# Patient Record
Sex: FEMALE | Race: WHITE | NOT HISPANIC OR LATINO
[De-identification: names, ages, dates, MRNs, and addresses within clinical notes are randomized per-mention and may not be internally consistent; named-entity substitution may affect disease eponyms.]

---

## 2019-02-11 ENCOUNTER — RESULT REVIEW (OUTPATIENT)
Age: 54
End: 2019-02-11

## 2019-03-21 ENCOUNTER — OUTPATIENT (OUTPATIENT)
Dept: OUTPATIENT SERVICES | Facility: HOSPITAL | Age: 54
LOS: 1 days | End: 2019-03-21
Payer: COMMERCIAL

## 2019-03-21 VITALS
WEIGHT: 207.01 LBS | RESPIRATION RATE: 18 BRPM | HEART RATE: 70 BPM | TEMPERATURE: 98 F | DIASTOLIC BLOOD PRESSURE: 60 MMHG | SYSTOLIC BLOOD PRESSURE: 116 MMHG | HEIGHT: 67 IN

## 2019-03-21 DIAGNOSIS — Z15.01 GENETIC SUSCEPTIBILITY TO MALIGNANT NEOPLASM OF BREAST: ICD-10-CM

## 2019-03-21 DIAGNOSIS — R73.03 PREDIABETES: ICD-10-CM

## 2019-03-21 DIAGNOSIS — Z90.710 ACQUIRED ABSENCE OF BOTH CERVIX AND UTERUS: Chronic | ICD-10-CM

## 2019-03-21 LAB
ANION GAP SERPL CALC-SCNC: 11 MMO/L — SIGNIFICANT CHANGE UP (ref 7–14)
BLD GP AB SCN SERPL QL: NEGATIVE — SIGNIFICANT CHANGE UP
BUN SERPL-MCNC: 16 MG/DL — SIGNIFICANT CHANGE UP (ref 7–23)
CALCIUM SERPL-MCNC: 9.8 MG/DL — SIGNIFICANT CHANGE UP (ref 8.4–10.5)
CHLORIDE SERPL-SCNC: 104 MMOL/L — SIGNIFICANT CHANGE UP (ref 98–107)
CO2 SERPL-SCNC: 25 MMOL/L — SIGNIFICANT CHANGE UP (ref 22–31)
CREAT SERPL-MCNC: 0.77 MG/DL — SIGNIFICANT CHANGE UP (ref 0.5–1.3)
GLUCOSE SERPL-MCNC: 163 MG/DL — HIGH (ref 70–99)
HBA1C BLD-MCNC: 6.3 % — HIGH (ref 4–5.6)
HCT VFR BLD CALC: 39.3 % — SIGNIFICANT CHANGE UP (ref 34.5–45)
HGB BLD-MCNC: 12.7 G/DL — SIGNIFICANT CHANGE UP (ref 11.5–15.5)
MCHC RBC-ENTMCNC: 29.5 PG — SIGNIFICANT CHANGE UP (ref 27–34)
MCHC RBC-ENTMCNC: 32.3 % — SIGNIFICANT CHANGE UP (ref 32–36)
MCV RBC AUTO: 91.4 FL — SIGNIFICANT CHANGE UP (ref 80–100)
NRBC # FLD: 0 K/UL — LOW (ref 25–125)
PLATELET # BLD AUTO: 287 K/UL — SIGNIFICANT CHANGE UP (ref 150–400)
PMV BLD: 9.8 FL — SIGNIFICANT CHANGE UP (ref 7–13)
POTASSIUM SERPL-MCNC: 3.8 MMOL/L — SIGNIFICANT CHANGE UP (ref 3.5–5.3)
POTASSIUM SERPL-SCNC: 3.8 MMOL/L — SIGNIFICANT CHANGE UP (ref 3.5–5.3)
RBC # BLD: 4.3 M/UL — SIGNIFICANT CHANGE UP (ref 3.8–5.2)
RBC # FLD: 13.3 % — SIGNIFICANT CHANGE UP (ref 10.3–14.5)
RH IG SCN BLD-IMP: POSITIVE — SIGNIFICANT CHANGE UP
SODIUM SERPL-SCNC: 140 MMOL/L — SIGNIFICANT CHANGE UP (ref 135–145)
WBC # BLD: 7.12 K/UL — SIGNIFICANT CHANGE UP (ref 3.8–10.5)
WBC # FLD AUTO: 7.12 K/UL — SIGNIFICANT CHANGE UP (ref 3.8–10.5)

## 2019-03-21 PROCEDURE — 93010 ELECTROCARDIOGRAM REPORT: CPT

## 2019-03-21 RX ORDER — SODIUM CHLORIDE 9 MG/ML
1000 INJECTION, SOLUTION INTRAVENOUS
Qty: 0 | Refills: 0 | Status: DISCONTINUED | OUTPATIENT
Start: 2019-04-03 | End: 2019-04-03

## 2019-03-21 NOTE — H&P PST ADULT - NSICDXFAMILYHX_GEN_ALL_CORE_FT
FAMILY HISTORY:  Father  Still living? No  Family history of early CAD, Age at diagnosis: Age Unknown    Mother  Still living? Yes, Estimated age: 81-90  Family history of hypertension, Age at diagnosis: Age Unknown

## 2019-03-21 NOTE — H&P PST ADULT - NEGATIVE MUSCULOSKELETAL SYMPTOMS
no back pain/no muscle cramps/no myalgia/no arthritis/no muscle weakness/no joint swelling/no arthralgia/no stiffness/no neck pain

## 2019-03-21 NOTE — H&P PST ADULT - NSICDXPASTMEDICALHX_GEN_ALL_CORE_FT
PAST MEDICAL HISTORY:  Allergic rhinitis     Asthma     Genetic susceptibility to breast cancer     Herpes simplex virus (HSV) infection of central nervous system     Obese     Prediabetes

## 2019-03-21 NOTE — H&P PST ADULT - HISTORY OF PRESENT ILLNESS
52 yo female presents to PST unit with pre-op diagnosis of 52 yo female presents to PST unit with pre-op diagnosis of genetic susceptibility to malignant neoplasm of breast scheduled for laparoscopic bilateral salpingo oophorectomy on 04/03/2019.

## 2019-03-21 NOTE — H&P PST ADULT - NSANTHOSAYNRD_GEN_A_CORE
No. MILLER screening performed.  STOP BANG Legend: 0-2 = LOW Risk; 3-4 = INTERMEDIATE Risk; 5-8 = HIGH Risk

## 2019-03-21 NOTE — H&P PST ADULT - NSICDXPROBLEM_GEN_ALL_CORE_FT
PROBLEM DIAGNOSES  Problem: Genetic susceptibility to breast cancer  Assessment and Plan: Scheduled for laparoscopic bilateral salpingo oophorectomy on 04/03/2019.  Pre-Op instructions provided to patient.  Pepcid for GI prophylaxis provided.   Surgical scrub instructions reviewed and provided to patient.   Patient will obtain medical clearance as per surgeons request-copy requested    Problem: Prediabetes  Assessment and Plan: Pt. instructed to hold Metformin the night before and morning of procedure. Accucheck DOS. OR booking notified of DM2

## 2019-03-21 NOTE — H&P PST ADULT - NEGATIVE NEUROLOGICAL SYMPTOMS
no paresthesias/no weakness/no generalized seizures/occasional headache relieved by OTC Tylenol/no focal seizures/no transient paralysis

## 2019-03-21 NOTE — H&P PST ADULT - NSICDXPASTSURGICALHX_GEN_ALL_CORE_FT
PAST SURGICAL HISTORY:  Carpal tunnel syndrome of right wrist Repair done on 5/7/14.    H/O: hysterectomy partial 2015    History of tonsillectomy at the age of 6

## 2019-03-21 NOTE — H&P PST ADULT - NEGATIVE ENMT SYMPTOMS
no dysphagia/no sinus symptoms/no vertigo/no tinnitus/no hearing difficulty/no ear pain/no nasal congestion

## 2019-03-21 NOTE — H&P PST ADULT - RS GEN PE MLT RESP DETAILS PC
no wheezes/good air movement/breath sounds equal/respirations non-labored/clear to auscultation bilaterally/airway patent

## 2019-03-22 ENCOUNTER — TRANSCRIPTION ENCOUNTER (OUTPATIENT)
Age: 54
End: 2019-03-22

## 2019-04-02 ENCOUNTER — TRANSCRIPTION ENCOUNTER (OUTPATIENT)
Age: 54
End: 2019-04-02

## 2019-04-02 NOTE — ASU PATIENT PROFILE, ADULT - PMH
Allergic rhinitis    Asthma    Genetic susceptibility to breast cancer  BRCA 1 positive  Herpes simplex virus (HSV) infection of central nervous system    Obese    Prediabetes

## 2019-04-02 NOTE — ASU PATIENT PROFILE, ADULT - PSH
Carpal tunnel syndrome of left wrist  surgery 2014  Carpal tunnel syndrome of right wrist  Repair done on 5/7/14.  H/O: hysterectomy  partial 2015  History of tonsillectomy  at the age of 6  Status post dilation and curettage

## 2019-04-03 ENCOUNTER — RESULT REVIEW (OUTPATIENT)
Age: 54
End: 2019-04-03

## 2019-04-03 ENCOUNTER — OUTPATIENT (OUTPATIENT)
Dept: OUTPATIENT SERVICES | Facility: HOSPITAL | Age: 54
LOS: 1 days | Discharge: HOME | End: 2019-04-03
Payer: COMMERCIAL

## 2019-04-03 VITALS — OXYGEN SATURATION: 98 % | HEART RATE: 55 BPM | RESPIRATION RATE: 16 BRPM | TEMPERATURE: 98 F

## 2019-04-03 VITALS
WEIGHT: 207.01 LBS | HEART RATE: 71 BPM | HEIGHT: 67 IN | TEMPERATURE: 98 F | DIASTOLIC BLOOD PRESSURE: 51 MMHG | RESPIRATION RATE: 16 BRPM | SYSTOLIC BLOOD PRESSURE: 113 MMHG | OXYGEN SATURATION: 95 %

## 2019-04-03 DIAGNOSIS — G56.02 CARPAL TUNNEL SYNDROME, LEFT UPPER LIMB: Chronic | ICD-10-CM

## 2019-04-03 DIAGNOSIS — Z98.890 OTHER SPECIFIED POSTPROCEDURAL STATES: Chronic | ICD-10-CM

## 2019-04-03 DIAGNOSIS — Z90.710 ACQUIRED ABSENCE OF BOTH CERVIX AND UTERUS: Chronic | ICD-10-CM

## 2019-04-03 DIAGNOSIS — Z15.01 GENETIC SUSCEPTIBILITY TO MALIGNANT NEOPLASM OF BREAST: ICD-10-CM

## 2019-04-03 DIAGNOSIS — C50.919 MALIGNANT NEOPLASM OF UNSPECIFIED SITE OF UNSPECIFIED FEMALE BREAST: ICD-10-CM

## 2019-04-03 LAB — GLUCOSE BLDC GLUCOMTR-MCNC: 146 MG/DL — HIGH (ref 70–99)

## 2019-04-03 PROCEDURE — 88305 TISSUE EXAM BY PATHOLOGIST: CPT | Mod: 26

## 2019-04-03 NOTE — ASU DISCHARGE PLAN (ADULT/PEDIATRIC) - NURSING INSTRUCTIONS
You received IV Tylenol for pain management at _8:00am__. Please DO NOT take any Tylenol (Acetaminophen) containing products, such as Vicodin, Percocet, Excedrin, and cold medications for the next 6 hours (until _2__ PM). DO NOT TAKE MORE THAN 3000 MG OF TYLENOL in a 24 hour period.  You received IV Toradol for pain management at _8:30am__. Please DO NOT take Motrin/Ibuprofen/Advil/Aleve/NSAIDs (Non-Steroidal Anti-Inflammatory Drugs) for the next 6 hours (until _2:30__ PM).

## 2019-04-03 NOTE — ASU DISCHARGE PLAN (ADULT/PEDIATRIC) - CARE PROVIDER_API CALL
Oziel Chavez)  Obstetrics and Gynecology  [address] [place] [address]  Phone: [phone]  Fax: [phone]  Follow Up Time:

## 2019-04-11 LAB — SURGICAL PATHOLOGY STUDY: SIGNIFICANT CHANGE UP

## 2019-10-30 ENCOUNTER — RESULT REVIEW (OUTPATIENT)
Age: 54
End: 2019-10-30

## 2020-10-14 ENCOUNTER — RESULT REVIEW (OUTPATIENT)
Age: 55
End: 2020-10-14

## 2021-10-18 ENCOUNTER — RESULT REVIEW (OUTPATIENT)
Age: 56
End: 2021-10-18

## 2022-06-19 ENCOUNTER — OFFICE (OUTPATIENT)
Dept: URBAN - METROPOLITAN AREA CLINIC 77 | Facility: CLINIC | Age: 57
Setting detail: OPHTHALMOLOGY
End: 2022-06-19
Payer: COMMERCIAL

## 2022-06-19 DIAGNOSIS — E11.9: ICD-10-CM

## 2022-06-19 DIAGNOSIS — H47.321: ICD-10-CM

## 2022-06-19 DIAGNOSIS — H25.13: ICD-10-CM

## 2022-06-19 DIAGNOSIS — H43.811: ICD-10-CM

## 2022-06-19 DIAGNOSIS — H46.8: ICD-10-CM

## 2022-06-19 PROCEDURE — 92014 COMPRE OPH EXAM EST PT 1/>: CPT | Performed by: STUDENT IN AN ORGANIZED HEALTH CARE EDUCATION/TRAINING PROGRAM

## 2022-06-19 PROCEDURE — 92083 EXTENDED VISUAL FIELD XM: CPT | Performed by: STUDENT IN AN ORGANIZED HEALTH CARE EDUCATION/TRAINING PROGRAM

## 2022-06-19 PROCEDURE — 92133 CPTRZD OPH DX IMG PST SGM ON: CPT | Performed by: STUDENT IN AN ORGANIZED HEALTH CARE EDUCATION/TRAINING PROGRAM

## 2022-06-19 ASSESSMENT — REFRACTION_CURRENTRX
OD_OVR_VA: 20/
OD_AXIS: 55
OS_OVR_VA: 20/
OD_ADD2: COMPUTER
OD_SPHERE: +1.75
OS_AXIS: 115
OD_CYLINDER: -0.50
OS_SPHERE: +1.00
OS_CYLINDER: -1.00

## 2022-06-19 ASSESSMENT — REFRACTION_MANIFEST
OD_AXIS: 55
OD_CYLINDER: -0.50
OS_ADD2: +2.25
OD_ADD: +1.25
OS_ADD: +1.25
OS_SPHERE: -0.75
OS_CYLINDER: -0.50
OS_VA1: 20/25
OS_AXIS: 95
OD_ADD2: +2.25
OD_SPHERE: +0.50
OD_VA1: 20/20

## 2022-06-19 ASSESSMENT — VISUAL ACUITY
OS_BCVA: 20/20
OD_BCVA: 20/40

## 2022-06-19 ASSESSMENT — KERATOMETRY
OD_AXISANGLE_DEGREES: 087
OD_K1POWER_DIOPTERS: 45.00
OS_K2POWER_DIOPTERS: 46.00
OS_AXISANGLE_DEGREES: 082
OS_K1POWER_DIOPTERS: 45.00
OD_K2POWER_DIOPTERS: 46.00

## 2022-06-19 ASSESSMENT — AXIALLENGTH_DERIVED
OD_AL: 22.7882
OD_AL: 22.8338
OS_AL: 23.2519
OS_AL: 23.2519

## 2022-06-19 ASSESSMENT — TONOMETRY
OD_IOP_MMHG: 14
OS_IOP_MMHG: 12

## 2022-06-19 ASSESSMENT — REFRACTION_AUTOREFRACTION
OD_AXIS: 52
OD_CYLINDER: -0.75
OD_SPHERE: +0.50
OS_CYLINDER: -0.50
OS_SPHERE: -0.75
OS_AXIS: 95

## 2022-06-19 ASSESSMENT — CONFRONTATIONAL VISUAL FIELD TEST (CVF)
OD_FINDINGS: FULL
OS_FINDINGS: FULL

## 2022-06-19 ASSESSMENT — SPHEQUIV_DERIVED
OS_SPHEQUIV: -1
OS_SPHEQUIV: -1
OD_SPHEQUIV: 0.25
OD_SPHEQUIV: 0.125

## 2022-06-23 NOTE — H&P PST ADULT - ENMT
Patient called asking for the results from her blood work from 5/31. Thank you. details… No oral lesions; no gross abnormalities

## 2022-10-28 PROBLEM — Z15.01 GENETIC SUSCEPTIBILITY TO MALIGNANT NEOPLASM OF BREAST: Chronic | Status: ACTIVE | Noted: 2019-03-21

## 2022-10-28 PROBLEM — B00.9 HERPESVIRAL INFECTION, UNSPECIFIED: Chronic | Status: ACTIVE | Noted: 2019-03-21

## 2022-10-28 PROBLEM — R73.03 PREDIABETES: Chronic | Status: ACTIVE | Noted: 2019-03-21

## 2022-12-07 ENCOUNTER — APPOINTMENT (OUTPATIENT)
Dept: OBGYN | Facility: CLINIC | Age: 57
End: 2022-12-07

## 2022-12-07 VITALS
OXYGEN SATURATION: 97 % | BODY MASS INDEX: 32.96 KG/M2 | SYSTOLIC BLOOD PRESSURE: 138 MMHG | DIASTOLIC BLOOD PRESSURE: 83 MMHG | WEIGHT: 210 LBS | HEIGHT: 67 IN | RESPIRATION RATE: 14 BRPM | HEART RATE: 90 BPM

## 2022-12-07 PROCEDURE — 99386 PREV VISIT NEW AGE 40-64: CPT

## 2022-12-07 NOTE — HISTORY OF PRESENT ILLNESS
[FreeTextEntry1] : 57 year old female presents to establish care for an annual wellness check. Pt is doing well with no complaints. Reports she is BRCA 1+. Denies any h/o abnl pap.\par \par Currently sexually active.\par \par OBHx:  x2 (,)\par GYNHx: h/o ovarian cyst, herpes, h/o prolapsed uterus\par \par PMHx: DM, peptic ulcer disease, asthma\par SurgHx: partial hysterectomy\par \par Current Meds: Singular, Allegra, Metformin, Breo, Valtrex, One-A-Day\par SHx: Clinical Pharmacist\par \par FamHx: heart disease - father / stroke - mother / pancreatic cancer - uncle(BRCA +)\par \par NKDA [Mammogramdate] : 09/21 [PapSmeardate] : 11/21 [BoneDensityDate] : 10/20 [ColonoscopyDate] : 01/21

## 2022-12-07 NOTE — PLAN
[FreeTextEntry1] : 57 year old female presents for annual\par \par -PAP done\par -Breast imaging utd\par -Colon utd\par \par RTO in 1 year

## 2022-12-12 LAB — HPV HIGH+LOW RISK DNA PNL CVX: NOT DETECTED

## 2022-12-19 LAB — CYTOLOGY CVX/VAG DOC THIN PREP: ABNORMAL

## 2023-01-05 ENCOUNTER — NON-APPOINTMENT (OUTPATIENT)
Age: 58
End: 2023-01-05

## 2023-01-23 PROBLEM — C25.9 FAMILIAL CARCINOMA OF PANCREAS: Status: ACTIVE | Noted: 2023-01-04

## 2023-01-23 PROBLEM — Z15.01 BRCA1 GENE MUTATION POSITIVE: Status: ACTIVE | Noted: 2023-01-04

## 2023-01-26 ENCOUNTER — APPOINTMENT (OUTPATIENT)
Dept: SURGICAL ONCOLOGY | Facility: CLINIC | Age: 58
End: 2023-01-26
Payer: COMMERCIAL

## 2023-01-26 DIAGNOSIS — Z15.01 GENETIC SUSCEPTIBILITY TO MALIGNANT NEOPLASM OF BREAST: ICD-10-CM

## 2023-01-26 DIAGNOSIS — C25.9 MALIGNANT NEOPLASM OF PANCREAS, UNSPECIFIED: ICD-10-CM

## 2023-01-26 DIAGNOSIS — Z15.09 GENETIC SUSCEPTIBILITY TO MALIGNANT NEOPLASM OF BREAST: ICD-10-CM

## 2023-01-26 PROCEDURE — 99244 OFF/OP CNSLTJ NEW/EST MOD 40: CPT | Mod: 95

## 2023-01-26 NOTE — ASSESSMENT
[FreeTextEntry1] : 57 year F  with BRCA 1 mutation and paternal uncle with pancreatic cacer at age 80.\par She presents to discuss options for pancreatic cancer screening. \par \par \par We discussed the indications for screening a patient at increased risk for pancreatic cancer. \par At this time these include: \par 1. STK 11/ CDKN2A mutation\par 1. A known/ likely pathogenic germline variant in a pancreatic cancer susceptibility gene (STANLEY, BRCA 1, BRCA 2, MLH1, MSH2, MSH6, EPCAM, PALB2, TP53) AND a family history of from the same side of the family (first or second degree relative)\par 2. Family history of exocrine pancreatic cancer in 2+ first degree relatives from same side of family. \par 3. Family history of exocrine pancreatic cancer in 3+ first and/ or second degree relatives from same side of family. \par \par Based on these criteria, she qualifies for high-risk screening. \par Baseline contrast enhanced MRI/ MRCP was normal. \par \par She is not interested in prophylactic mastectomy at this time, but is undergoing recommended screening with mammo and MRI alternating every 6 months. \par \par We also discussed a slight (approx 2%) increased risk of melanoma. I suggested she follow with dermatology for annual skin exam.\par \par I discussed symptoms that would raise my concern for malignant transformation including weight loss, jaundice, abdominal/ back pain, and new onset diabetes. Should these develop, she should call immediately. \par \par In the absence of new symptoms I recommend continued surveillance with alternating MRI and EUS.\par \par Plan: \par Follow up in 1 year after EUS\par Cyst clinic with Darby Brown \par

## 2023-01-26 NOTE — HISTORY OF PRESENT ILLNESS
[de-identified] : This visit was provided via telehealth using real-time 2-way audio visual technology. The patient, SERENITY KELLEY , was located at home [address] [place] [address]\[place] [address] at the time of the visit. This provider was located at the clinic in [place], [place] at the time of the visit. The provider, patient and []  participated in the telehealth encounter.  Verbal consent was given 01/26/2023 by the patient.\par \par \par 57F who is BRCA 1+ presents for the [facility]. \par A[employer]kenazi [employer]. \par This was completed as part of a study at [facility]. A year after her uncle's death, she was notified that he had BRCA mutation and was eligible testing. This was in 2019. \par \par Family cancer hx:   Paternal uncle-  PDAC  (age 80,  had BRCA 1 & 2),  Father- lymphoma (age 55), paternal grandmother-  GI cancer ?stomach, & colon (late 80s). No history of breast cancer, but very few females in her family. \par \par PMH:  DM2 (last Ac1 7.2% on 12/15/22)  unsure dx date but was prediabetic for many years ,  & asthma\par She offers no symptoms or GI complaints \par \par PSHx: Bilateral oophorectomy, hysterectomy\par \par Has considered bilateral mastectomy, but has opted to continue with ongoing screening. Alternated mammo and breast MRI every 6 months. ([facility] breast [place]) \par \par Saw genetics at [facility] a few years ago.  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 Lives in [place] with her . Non-smoker, no alcohol. Watches tv for fun\par \par MRI MRCP 1/16/23-  no evidence of pancreatic cancer. Moderate hepatosteatosis. Mild increased enhancement multiple segments upper GI tract. Findings reflect mild severity of a mild upper GI inflammatory condition, such as food allergy/IBS.

## 2023-02-08 NOTE — ASU PATIENT PROFILE, ADULT - NS PRO TALK SOMEONE YN
Implemented All Fall Risk Interventions:  [place] to call system. Call bell, personal items and telephone within reach. Instruct patient to call for assistance. Room bathroom lighting operational. Non-slip footwear when patient is off stretcher. Physically safe environment: no spills, clutter or unnecessary equipment. Stretcher in lowest position, wheels locked, appropriate side rails in place. Provide visual cue, wrist band, yellow gown, etc. Monitor gait and stability. Monitor for mental status changes and reorient to person, place, and time. Review medications for side effects contributing to fall risk. Reinforce activity limits and safety measures with patient and family.
no

## 2023-03-23 ENCOUNTER — OFFICE (OUTPATIENT)
Dept: URBAN - METROPOLITAN AREA CLINIC 76 | Facility: CLINIC | Age: 58
Setting detail: OPHTHALMOLOGY
End: 2023-03-23
Payer: COMMERCIAL

## 2023-03-23 DIAGNOSIS — H43.811: ICD-10-CM

## 2023-03-23 DIAGNOSIS — E11.9: ICD-10-CM

## 2023-03-23 DIAGNOSIS — H46.8: ICD-10-CM

## 2023-03-23 DIAGNOSIS — H47.321: ICD-10-CM

## 2023-03-23 DIAGNOSIS — H25.13: ICD-10-CM

## 2023-03-23 PROCEDURE — 92014 COMPRE OPH EXAM EST PT 1/>: CPT | Performed by: OPHTHALMOLOGY

## 2023-03-23 PROCEDURE — 92250 FUNDUS PHOTOGRAPHY W/I&R: CPT | Performed by: OPHTHALMOLOGY

## 2023-03-23 ASSESSMENT — REFRACTION_MANIFEST
OS_ADD: +1.25
OS_AXIS: 95
OS_CYLINDER: -0.50
OD_CYLINDER: -0.50
OS_VA1: 20/25
OD_ADD: +1.25
OD_AXIS: 55
OD_VA1: 20/20
OD_ADD2: +2.25
OS_SPHERE: -0.75
OD_SPHERE: +0.50
OS_ADD2: +2.25

## 2023-03-23 ASSESSMENT — KERATOMETRY
OD_AXISANGLE_DEGREES: 077
OD_K1POWER_DIOPTERS: 45.00
OS_AXISANGLE_DEGREES: 079
OS_K1POWER_DIOPTERS: 45.00
OD_K2POWER_DIOPTERS: 45.75
OS_K2POWER_DIOPTERS: 45.75

## 2023-03-23 ASSESSMENT — REFRACTION_CURRENTRX
OD_AXIS: 55
OS_SPHERE: +1.00
OD_ADD2: COMPUTER
OS_AXIS: 115
OD_OVR_VA: 20/
OS_OVR_VA: 20/
OS_CYLINDER: -1.00
OD_SPHERE: +1.75
OD_CYLINDER: -0.50

## 2023-03-23 ASSESSMENT — CONFRONTATIONAL VISUAL FIELD TEST (CVF)
OD_FINDINGS: FULL
OS_FINDINGS: FULL

## 2023-03-23 ASSESSMENT — REFRACTION_AUTOREFRACTION
OS_CYLINDER: -0.75
OS_SPHERE: -0.50
OS_AXIS: 127
OD_AXIS: 058
OD_SPHERE: +0.50
OD_CYLINDER: -0.75

## 2023-03-23 ASSESSMENT — TONOMETRY
OS_IOP_MMHG: 19
OD_IOP_MMHG: 16

## 2023-03-23 ASSESSMENT — SPHEQUIV_DERIVED
OD_SPHEQUIV: 0.125
OD_SPHEQUIV: 0.25
OS_SPHEQUIV: -1
OS_SPHEQUIV: -0.875

## 2023-03-23 ASSESSMENT — AXIALLENGTH_DERIVED
OD_AL: 22.8311
OS_AL: 23.2492
OS_AL: 23.2965
OD_AL: 22.8769

## 2023-03-23 ASSESSMENT — VISUAL ACUITY
OD_BCVA: 20/40
OS_BCVA: 20/20-2

## 2023-06-22 ENCOUNTER — APPOINTMENT (OUTPATIENT)
Dept: ORTHOPEDIC SURGERY | Facility: CLINIC | Age: 58
End: 2023-06-22
Payer: COMMERCIAL

## 2023-06-22 VITALS — BODY MASS INDEX: 31.86 KG/M2 | WEIGHT: 203 LBS | HEIGHT: 67 IN

## 2023-06-22 DIAGNOSIS — J45.909 UNSPECIFIED ASTHMA, UNCOMPLICATED: ICD-10-CM

## 2023-06-22 DIAGNOSIS — E11.9 TYPE 2 DIABETES MELLITUS W/OUT COMPLICATIONS: ICD-10-CM

## 2023-06-22 DIAGNOSIS — Z00.00 ENCOUNTER FOR GENERAL ADULT MEDICAL EXAMINATION W/OUT ABNORMAL FINDINGS: ICD-10-CM

## 2023-06-22 PROCEDURE — 99203 OFFICE O/P NEW LOW 30 MIN: CPT

## 2023-06-22 PROCEDURE — 72170 X-RAY EXAM OF PELVIS: CPT

## 2023-06-22 PROCEDURE — 72100 X-RAY EXAM L-S SPINE 2/3 VWS: CPT

## 2023-06-22 RX ORDER — METHYLPREDNISOLONE 4 MG/1
4 TABLET ORAL
Qty: 1 | Refills: 0 | Status: ACTIVE | COMMUNITY
Start: 2023-06-22 | End: 1900-01-01

## 2023-06-22 RX ORDER — CYCLOBENZAPRINE HYDROCHLORIDE 5 MG/1
5 TABLET, FILM COATED ORAL
Qty: 30 | Refills: 0 | Status: ACTIVE | COMMUNITY
Start: 2023-06-22 | End: 1900-01-01

## 2023-06-22 NOTE — ASSESSMENT
[FreeTextEntry1] : Xrays reviewed with patient\par Treatment options discussed\par MDP sent for pain and inflammation\par Flexeril sent for spasm \par Recommend course of PT/HEP\par Follow up in 2 weeks with spine specialist

## 2023-06-22 NOTE — PHYSICAL EXAM
[] : patient ambulates without assistive device [Disc space narrowing] : Disc space narrowing [There are no fractures, subluxations or dislocations. No significant abnormalities are seen] : There are no fractures, subluxations or dislocations. No significant abnormalities are seen

## 2023-06-22 NOTE — HISTORY OF PRESENT ILLNESS
[Lower back] : lower back [8] : 8 [3] : 3 [Dull/Aching] : dull/aching [Localized] : localized [Constant] : constant [Standing] : standing [Full time] : Work status: full time [de-identified] : 6/22/23: Patient is a 57 yo female c/o low back pain for 4 days with no specific injury. No n/t. Taking advil and using heat with little relief. Pain is worse with getting up from sitting. No previous surgeries to low back.  [] : Post Surgical Visit: no [FreeTextEntry3] : 6/18/23 [FreeTextEntry5] : Patient complains of lower back pain after bending to pick something up on 6/18/23, H/O back pain for a couple of years

## 2023-06-29 ENCOUNTER — OFFICE (OUTPATIENT)
Dept: URBAN - METROPOLITAN AREA CLINIC 76 | Facility: CLINIC | Age: 58
Setting detail: OPHTHALMOLOGY
End: 2023-06-29
Payer: COMMERCIAL

## 2023-06-29 DIAGNOSIS — H46.8: ICD-10-CM

## 2023-06-29 DIAGNOSIS — E11.9: ICD-10-CM

## 2023-06-29 DIAGNOSIS — H25.13: ICD-10-CM

## 2023-06-29 DIAGNOSIS — H35.013: ICD-10-CM

## 2023-06-29 DIAGNOSIS — H43.811: ICD-10-CM

## 2023-06-29 DIAGNOSIS — H47.321: ICD-10-CM

## 2023-06-29 PROCEDURE — 92014 COMPRE OPH EXAM EST PT 1/>: CPT | Performed by: OPHTHALMOLOGY

## 2023-06-29 ASSESSMENT — KERATOMETRY
OD_K2POWER_DIOPTERS: 45.75
OS_AXISANGLE_DEGREES: 079
OS_K2POWER_DIOPTERS: 45.75
OD_AXISANGLE_DEGREES: 077
OD_K1POWER_DIOPTERS: 45.00
OS_K1POWER_DIOPTERS: 45.00

## 2023-06-29 ASSESSMENT — REFRACTION_MANIFEST
OS_VA1: 20/25
OD_ADD: +1.25
OS_SPHERE: -0.75
OD_SPHERE: PLANO
OS_AXIS: 85
OS_ADD: +1.25
OS_CYLINDER: -0.75
OS_CYLINDER: -0.50
OD_VA1: 20/20
OD_VA1: 20/20
OD_ADD: +1.25
OD_CYLINDER: SPH
OD_SPHERE: +0.50
OS_ADD2: +2.25
OS_SPHERE: -0.50
OD_CYLINDER: -0.50
OD_AXIS: 55
OS_ADD: +1.25
OS_VA1: 20/25
OD_ADD2: +2.25
OS_AXIS: 95

## 2023-06-29 ASSESSMENT — AXIALLENGTH_DERIVED
OD_AL: 22.8311
OS_AL: 23.2492
OS_AL: 23.2965
OS_AL: 23.2492
OD_AL: 22.8769

## 2023-06-29 ASSESSMENT — REFRACTION_CURRENTRX
OD_OVR_VA: 20/
OD_SPHERE: +1.75
OS_SPHERE: +1.00
OD_CYLINDER: -0.50
OD_ADD2: COMPUTER
OD_AXIS: 55
OS_AXIS: 115
OS_CYLINDER: -1.00
OS_OVR_VA: 20/

## 2023-06-29 ASSESSMENT — TONOMETRY
OD_IOP_MMHG: 19
OS_IOP_MMHG: 21

## 2023-06-29 ASSESSMENT — REFRACTION_AUTOREFRACTION
OS_SPHERE: -0.50
OD_CYLINDER: -0.75
OD_AXIS: 058
OS_CYLINDER: -0.75
OS_AXIS: 127
OD_SPHERE: +0.50

## 2023-06-29 ASSESSMENT — SPHEQUIV_DERIVED
OS_SPHEQUIV: -0.875
OD_SPHEQUIV: 0.25
OS_SPHEQUIV: -1
OD_SPHEQUIV: 0.125
OS_SPHEQUIV: -0.875

## 2023-06-29 ASSESSMENT — VISUAL ACUITY
OD_BCVA: 20/60
OS_BCVA: 20/20

## 2023-06-29 ASSESSMENT — CONFRONTATIONAL VISUAL FIELD TEST (CVF)
OD_FINDINGS: FULL
OS_FINDINGS: FULL

## 2023-07-18 ENCOUNTER — APPOINTMENT (OUTPATIENT)
Dept: ORTHOPEDIC SURGERY | Facility: CLINIC | Age: 58
End: 2023-07-18

## 2023-11-10 ENCOUNTER — APPOINTMENT (OUTPATIENT)
Dept: ORTHOPEDIC SURGERY | Facility: CLINIC | Age: 58
End: 2023-11-10
Payer: COMMERCIAL

## 2023-11-10 VITALS — HEIGHT: 67 IN | WEIGHT: 203 LBS | BODY MASS INDEX: 31.86 KG/M2

## 2023-11-10 DIAGNOSIS — M62.830 MUSCLE SPASM OF BACK: ICD-10-CM

## 2023-11-10 PROCEDURE — 99214 OFFICE O/P EST MOD 30 MIN: CPT

## 2023-11-26 ENCOUNTER — APPOINTMENT (OUTPATIENT)
Dept: MRI IMAGING | Facility: CLINIC | Age: 58
End: 2023-11-26
Payer: COMMERCIAL

## 2023-11-26 PROCEDURE — 72148 MRI LUMBAR SPINE W/O DYE: CPT

## 2023-12-18 ENCOUNTER — APPOINTMENT (OUTPATIENT)
Dept: ORTHOPEDIC SURGERY | Facility: CLINIC | Age: 58
End: 2023-12-18
Payer: COMMERCIAL

## 2023-12-18 DIAGNOSIS — M54.16 RADICULOPATHY, LUMBAR REGION: ICD-10-CM

## 2023-12-18 DIAGNOSIS — M51.9 UNSPECIFIED THORACIC, THORACOLUMBAR AND LUMBOSACRAL INTERVERTEBRAL DISC DISORDER: ICD-10-CM

## 2023-12-18 PROCEDURE — 99213 OFFICE O/P EST LOW 20 MIN: CPT

## 2023-12-18 NOTE — PHYSICAL EXAM
[Flexion] : flexion [Extension] : extension [Bending to left] : bending to left [Bending to right] : bending to right [] : negative sitting straight leg raise

## 2023-12-18 NOTE — ASSESSMENT
[FreeTextEntry1] : 58 F with LBP.  Has been in PT for last 5 months without relief. c/w Pt consider Pain management for MASTER Fu 2 months

## 2023-12-18 NOTE — HISTORY OF PRESENT ILLNESS
[Lower back] : lower back [8] : 8 [3] : 3 [Dull/Aching] : dull/aching [Localized] : localized [de-identified] : 12/18/2023: no signiifcant changes in pain since last office visit, follow up mri  11/10/2023: Patient presents tdoay with complaints of chronic low back pain that has worsened since June 2023 after taking out garbage. Patient reports at that time, she experienced increased left sided low back pain with Left radicular pain and intermittent numbness. Patient went to [facility], was given MDP and flexeril then completed course of PT with some improvement. Patient denies change in b/b function.    PmHx: DM (A1C6.3), Asthma All: NKDA Occupation: Pharmacy- [employer]   [] : Post Surgical Visit: no

## 2023-12-18 NOTE — IMAGING
[de-identified] : X-ray Ap/Lateral of lumbar spine were viewed and interpreted.  Normal alignment is maintained without any spondylolisthesis.  multilevel degen changes worst at L3-4

## 2023-12-18 NOTE — DATA REVIEWED
[Lumbar Spine] : lumbar spine [I independently reviewed and interpreted images and report] : I independently reviewed and interpreted images and report [FreeTextEntry1] : multilevel HNP with stenosis. Full report in chart

## 2024-01-10 ENCOUNTER — APPOINTMENT (OUTPATIENT)
Dept: PAIN MANAGEMENT | Facility: CLINIC | Age: 59
End: 2024-01-10

## 2024-01-18 ENCOUNTER — RESULT REVIEW (OUTPATIENT)
Age: 59
End: 2024-01-18

## 2024-01-18 ENCOUNTER — OUTPATIENT (OUTPATIENT)
Dept: OUTPATIENT SERVICES | Facility: HOSPITAL | Age: 59
LOS: 1 days | End: 2024-01-18
Payer: COMMERCIAL

## 2024-01-18 ENCOUNTER — APPOINTMENT (OUTPATIENT)
Dept: GASTROENTEROLOGY | Facility: HOSPITAL | Age: 59
End: 2024-01-18

## 2024-01-18 ENCOUNTER — TRANSCRIPTION ENCOUNTER (OUTPATIENT)
Age: 59
End: 2024-01-18

## 2024-01-18 VITALS
HEART RATE: 71 BPM | RESPIRATION RATE: 15 BRPM | DIASTOLIC BLOOD PRESSURE: 68 MMHG | OXYGEN SATURATION: 95 % | SYSTOLIC BLOOD PRESSURE: 148 MMHG | TEMPERATURE: 97 F

## 2024-01-18 VITALS
RESPIRATION RATE: 19 BRPM | DIASTOLIC BLOOD PRESSURE: 57 MMHG | OXYGEN SATURATION: 98 % | HEART RATE: 77 BPM | SYSTOLIC BLOOD PRESSURE: 116 MMHG

## 2024-01-18 DIAGNOSIS — G56.02 CARPAL TUNNEL SYNDROME, LEFT UPPER LIMB: Chronic | ICD-10-CM

## 2024-01-18 DIAGNOSIS — Z15.01 GENETIC SUSCEPTIBILITY TO MALIGNANT NEOPLASM OF BREAST: ICD-10-CM

## 2024-01-18 DIAGNOSIS — Z90.710 ACQUIRED ABSENCE OF BOTH CERVIX AND UTERUS: Chronic | ICD-10-CM

## 2024-01-18 DIAGNOSIS — Z98.890 OTHER SPECIFIED POSTPROCEDURAL STATES: Chronic | ICD-10-CM

## 2024-01-18 LAB — GLUCOSE BLDC GLUCOMTR-MCNC: 131 MG/DL — HIGH (ref 70–99)

## 2024-01-18 PROCEDURE — 43259 EGD US EXAM DUODENUM/JEJUNUM: CPT

## 2024-01-18 PROCEDURE — 43239 EGD BIOPSY SINGLE/MULTIPLE: CPT

## 2024-01-18 PROCEDURE — 82962 GLUCOSE BLOOD TEST: CPT

## 2024-01-18 PROCEDURE — 43237 ENDOSCOPIC US EXAM ESOPH: CPT

## 2024-01-18 PROCEDURE — 88305 TISSUE EXAM BY PATHOLOGIST: CPT | Mod: 26

## 2024-01-18 PROCEDURE — 88305 TISSUE EXAM BY PATHOLOGIST: CPT

## 2024-01-18 RX ORDER — IBUPROFEN 200 MG
1 TABLET ORAL
Qty: 0 | Refills: 0 | DISCHARGE

## 2024-01-18 RX ORDER — DULAGLUTIDE 4.5 MG/.5ML
0.75 INJECTION, SOLUTION SUBCUTANEOUS
Refills: 0 | DISCHARGE

## 2024-01-18 RX ORDER — METFORMIN HYDROCHLORIDE 850 MG/1
1 TABLET ORAL
Qty: 0 | Refills: 0 | DISCHARGE

## 2024-01-18 RX ORDER — ACETAMINOPHEN 500 MG
2 TABLET ORAL
Qty: 0 | Refills: 0 | DISCHARGE

## 2024-01-18 RX ORDER — MONTELUKAST 4 MG/1
1 TABLET, CHEWABLE ORAL
Qty: 0 | Refills: 0 | DISCHARGE

## 2024-01-18 RX ORDER — OMEPRAZOLE 10 MG/1
1 CAPSULE, DELAYED RELEASE ORAL
Refills: 0 | DISCHARGE

## 2024-01-18 RX ORDER — FEXOFENADINE HCL 30 MG
1 TABLET ORAL
Qty: 0 | Refills: 0 | DISCHARGE

## 2024-01-18 RX ORDER — ALBUTEROL 90 UG/1
1 AEROSOL, METERED ORAL
Qty: 0 | Refills: 0 | DISCHARGE

## 2024-01-18 NOTE — ASU PATIENT PROFILE, ADULT - NSICDXPASTSURGICALHX_GEN_ALL_CORE_FT
Left message to call back.   PAST SURGICAL HISTORY:  Carpal tunnel syndrome of left wrist surgery 2014    Carpal tunnel syndrome of right wrist Repair done on 5/7/14.    H/O: hysterectomy partial 2015    History of tonsillectomy at the age of 6    Status post dilation and curettage

## 2024-01-18 NOTE — PRE PROCEDURE NOTE - PRE PROCEDURE EVALUATION
Attending Physician:  Griffin                          Procedure: EGD EUS    Indication for Procedure: PDAC screening  ________________________________________________________  PAST MEDICAL & SURGICAL HISTORY:  Asthma      Allergic rhinitis      Obese      Herpes simplex virus (HSV) infection of central nervous system      Genetic susceptibility to breast cancer  BRCA 1 positive      Prediabetes      History of tonsillectomy  at the age of 6      Carpal tunnel syndrome of right wrist  Repair done on 5/7/14.      H/O: hysterectomy  partial 2015      Carpal tunnel syndrome of left wrist  surgery 2014      Status post dilation and curettage        ALLERGIES:  No Known Allergies    HOME MEDICATIONS:  acetaminophen 325 mg oral tablet: 2 tab(s) orally every 4 hours  albuterol 90 mcg/inh inhalation aerosol with adapter: 1 puff(s) inhaled 4 times a day, As Needed  fexofenadine 180 mg oral tablet: 1 tab(s) orally once a day, PM  ibuprofen 200 mg oral capsule: 1 cap(s) orally every 6 hours, As Needed, LD 03/26  metFORMIN 500 mg oral tablet: 1 tab(s) orally once a day, PM  montelukast 10 mg oral tablet: 1 tab(s) orally once a day, PM    Multiple Vitamins oral capsule: 1 cap(s) orally once a day,PM LD03/26  omeprazole 40 mg oral delayed release capsule: 1 cap(s) orally once a day  Trulicity Pen 0.75 mg/0.5 mL subcutaneous solution: 0.75 milligram(s) subcutaneously once a week  Valcyclovir: 500 milligram(s) orally once a day,PM    AICD/PPM: [ ] yes   [ ] no    PERTINENT LAB DATA:                      PHYSICAL EXAMINATION:    Height (cm): 170.2  Weight (kg): 89.4  BMI (kg/m2): 30.9  BSA (m2): 2.01T(C): 36.9  HR: 76  BP: 111/56  RR: 20  SpO2: 96%    Constitutional: NAD  HEENT: PERRLA, EOMI,    Neck:  No JVD  Respiratory: CTAB/L  Cardiovascular: S1 and S2  Gastrointestinal: BS+, soft, NT/ND  Extremities: No peripheral edema  Neurological: A/O x 3, no focal deficits  Psychiatric: Normal mood, normal affect  Skin: No rashes    ASA Class: I [ ]  II [ ]  III [ ]  IV [ ]    COMMENTS:    The patient is a suitable candidate for the planned procedure unless box checked [ ]  No, explain:

## 2024-01-18 NOTE — ASU PATIENT PROFILE, ADULT - NSICDXPASTMEDICALHX_GEN_ALL_CORE_FT
PAST MEDICAL HISTORY:  Allergic rhinitis     Asthma     Genetic susceptibility to breast cancer BRCA 1 positive    Herpes simplex virus (HSV) infection of central nervous system     Obese     Prediabetes

## 2024-01-23 LAB — SURGICAL PATHOLOGY STUDY: SIGNIFICANT CHANGE UP

## 2024-02-02 ENCOUNTER — TRANSCRIPTION ENCOUNTER (OUTPATIENT)
Age: 59
End: 2024-02-02

## 2024-03-20 ENCOUNTER — APPOINTMENT (OUTPATIENT)
Dept: OBGYN | Facility: CLINIC | Age: 59
End: 2024-03-20
Payer: COMMERCIAL

## 2024-03-20 VITALS — DIASTOLIC BLOOD PRESSURE: 71 MMHG | BODY MASS INDEX: 31.95 KG/M2 | SYSTOLIC BLOOD PRESSURE: 120 MMHG | WEIGHT: 204 LBS

## 2024-03-20 DIAGNOSIS — Z01.419 ENCOUNTER FOR GYNECOLOGICAL EXAMINATION (GENERAL) (ROUTINE) W/OUT ABNORMAL FINDINGS: ICD-10-CM

## 2024-03-20 PROCEDURE — 99396 PREV VISIT EST AGE 40-64: CPT

## 2024-03-20 NOTE — END OF VISIT
[FreeTextEntry3] : I, Elina Robert, acted as a scribe on behalf of Dr. Marivel Stout D.O. on 03/20/2024.  All medical entries made by the scribe were at my, Dr. Marivel Stout D.O, direction and personally dictated by me on 03/20/2024. I have reviewed the chart and agree that the record accurately reflects my personal performance of the history, physical exam, assessment and plan. I have also personally directed, reviewed, and agreed with the chart.

## 2024-03-20 NOTE — PLAN
[FreeTextEntry1] : 58 year old female presents for an annual  -PAP done -Breast mammo utd, as per pt  RTO in 1 year

## 2024-03-20 NOTE — PHYSICAL EXAM
[Appropriately responsive] : appropriately responsive [Alert] : alert [No Acute Distress] : no acute distress [Soft] : soft [Non-tender] : non-tender [Non-distended] : non-distended [No HSM] : No HSM [No Lesions] : no lesions [Oriented x3] : oriented x3 [No Mass] : no mass [Examination Of The Breasts] : a normal appearance [No Masses] : no breast masses were palpable [Labia Majora] : normal [Labia Minora] : normal [Normal] : normal [Absent] : absent [Uterine Adnexae] : normal

## 2024-03-20 NOTE — HISTORY OF PRESENT ILLNESS
[FreeTextEntry1] : 58 year old female presents for an annual. Pt is doing well with no complaints.  GYNHx: BRCA 1 +, h/o ovarian cysts PSHx: partial hysterectomy

## 2024-03-22 LAB — HPV HIGH+LOW RISK DNA PNL CVX: NOT DETECTED

## 2024-03-25 LAB — CYTOLOGY CVX/VAG DOC THIN PREP: ABNORMAL

## 2024-04-09 ENCOUNTER — OFFICE (OUTPATIENT)
Dept: URBAN - METROPOLITAN AREA CLINIC 76 | Facility: CLINIC | Age: 59
Setting detail: OPHTHALMOLOGY
End: 2024-04-09
Payer: COMMERCIAL

## 2024-04-09 DIAGNOSIS — H46.8: ICD-10-CM

## 2024-04-09 DIAGNOSIS — H47.321: ICD-10-CM

## 2024-04-09 DIAGNOSIS — H43.811: ICD-10-CM

## 2024-04-09 DIAGNOSIS — H35.013: ICD-10-CM

## 2024-04-09 DIAGNOSIS — H25.13: ICD-10-CM

## 2024-04-09 DIAGNOSIS — E11.9: ICD-10-CM

## 2024-04-09 PROCEDURE — 92250 FUNDUS PHOTOGRAPHY W/I&R: CPT | Performed by: OPHTHALMOLOGY

## 2024-04-09 PROCEDURE — 92014 COMPRE OPH EXAM EST PT 1/>: CPT | Performed by: OPHTHALMOLOGY

## 2024-12-31 ENCOUNTER — NON-APPOINTMENT (OUTPATIENT)
Age: 59
End: 2024-12-31

## 2025-02-06 ENCOUNTER — APPOINTMENT (OUTPATIENT)
Dept: SURGICAL ONCOLOGY | Facility: CLINIC | Age: 60
End: 2025-02-06

## 2025-02-06 DIAGNOSIS — C25.9 MALIGNANT NEOPLASM OF PANCREAS, UNSPECIFIED: ICD-10-CM

## 2025-02-06 DIAGNOSIS — Z15.01 GENETIC SUSCEPTIBILITY TO MALIGNANT NEOPLASM OF BREAST: ICD-10-CM

## 2025-02-06 DIAGNOSIS — Z15.09 GENETIC SUSCEPTIBILITY TO MALIGNANT NEOPLASM OF BREAST: ICD-10-CM

## 2025-02-06 PROCEDURE — 99213 OFFICE O/P EST LOW 20 MIN: CPT | Mod: 95

## 2025-02-12 ENCOUNTER — APPOINTMENT (OUTPATIENT)
Dept: PAIN MANAGEMENT | Facility: CLINIC | Age: 60
End: 2025-02-12
Payer: COMMERCIAL

## 2025-02-12 VITALS — HEIGHT: 66 IN | BODY MASS INDEX: 31.66 KG/M2 | WEIGHT: 197 LBS

## 2025-02-12 DIAGNOSIS — Z82.49 FAMILY HISTORY OF ISCHEMIC HEART DISEASE AND OTHER DISEASES OF THE CIRCULATORY SYSTEM: ICD-10-CM

## 2025-02-12 DIAGNOSIS — M51.9 UNSPECIFIED THORACIC, THORACOLUMBAR AND LUMBOSACRAL INTERVERTEBRAL DISC DISORDER: ICD-10-CM

## 2025-02-12 DIAGNOSIS — M54.16 RADICULOPATHY, LUMBAR REGION: ICD-10-CM

## 2025-02-12 PROCEDURE — 99204 OFFICE O/P NEW MOD 45 MIN: CPT

## 2025-03-07 ENCOUNTER — APPOINTMENT (OUTPATIENT)
Dept: PAIN MANAGEMENT | Facility: CLINIC | Age: 60
End: 2025-03-07
Payer: COMMERCIAL

## 2025-03-07 DIAGNOSIS — M54.16 RADICULOPATHY, LUMBAR REGION: ICD-10-CM

## 2025-03-07 DIAGNOSIS — M51.9 UNSPECIFIED THORACIC, THORACOLUMBAR AND LUMBOSACRAL INTERVERTEBRAL DISC DISORDER: ICD-10-CM

## 2025-03-07 PROCEDURE — 82948 REAGENT STRIP/BLOOD GLUCOSE: CPT

## 2025-03-07 PROCEDURE — 62323 NJX INTERLAMINAR LMBR/SAC: CPT

## 2025-03-24 ENCOUNTER — APPOINTMENT (OUTPATIENT)
Dept: OBGYN | Facility: CLINIC | Age: 60
End: 2025-03-24
Payer: COMMERCIAL

## 2025-03-24 VITALS
BODY MASS INDEX: 31.66 KG/M2 | DIASTOLIC BLOOD PRESSURE: 77 MMHG | HEIGHT: 66 IN | SYSTOLIC BLOOD PRESSURE: 125 MMHG | WEIGHT: 197 LBS

## 2025-03-24 DIAGNOSIS — Z01.419 ENCOUNTER FOR GYNECOLOGICAL EXAMINATION (GENERAL) (ROUTINE) W/OUT ABNORMAL FINDINGS: ICD-10-CM

## 2025-03-24 PROCEDURE — 99396 PREV VISIT EST AGE 40-64: CPT

## 2025-03-24 PROCEDURE — 99459 PELVIC EXAMINATION: CPT

## 2025-03-25 ENCOUNTER — NON-APPOINTMENT (OUTPATIENT)
Age: 60
End: 2025-03-25

## 2025-03-26 ENCOUNTER — APPOINTMENT (OUTPATIENT)
Dept: PAIN MANAGEMENT | Facility: CLINIC | Age: 60
End: 2025-03-26
Payer: COMMERCIAL

## 2025-03-26 VITALS — WEIGHT: 192 LBS | HEIGHT: 66 IN | BODY MASS INDEX: 30.86 KG/M2

## 2025-03-26 DIAGNOSIS — M51.9 UNSPECIFIED THORACIC, THORACOLUMBAR AND LUMBOSACRAL INTERVERTEBRAL DISC DISORDER: ICD-10-CM

## 2025-03-26 DIAGNOSIS — M54.16 RADICULOPATHY, LUMBAR REGION: ICD-10-CM

## 2025-03-26 LAB — HPV HIGH+LOW RISK DNA PNL CVX: NOT DETECTED

## 2025-03-26 PROCEDURE — 99214 OFFICE O/P EST MOD 30 MIN: CPT

## 2025-03-29 LAB — CYTOLOGY CVX/VAG DOC THIN PREP: ABNORMAL

## 2025-04-15 ENCOUNTER — OFFICE (OUTPATIENT)
Dept: URBAN - METROPOLITAN AREA CLINIC 77 | Facility: CLINIC | Age: 60
Setting detail: OPHTHALMOLOGY
End: 2025-04-15
Payer: COMMERCIAL

## 2025-04-15 DIAGNOSIS — H35.013: ICD-10-CM

## 2025-04-15 DIAGNOSIS — E11.9: ICD-10-CM

## 2025-04-15 DIAGNOSIS — H43.813: ICD-10-CM

## 2025-04-15 DIAGNOSIS — H47.321: ICD-10-CM

## 2025-04-15 DIAGNOSIS — H35.363: ICD-10-CM

## 2025-04-15 DIAGNOSIS — H25.13: ICD-10-CM

## 2025-04-15 DIAGNOSIS — H46.8: ICD-10-CM

## 2025-04-15 PROCEDURE — 92014 COMPRE OPH EXAM EST PT 1/>: CPT | Performed by: STUDENT IN AN ORGANIZED HEALTH CARE EDUCATION/TRAINING PROGRAM

## 2025-04-15 PROCEDURE — 92250 FUNDUS PHOTOGRAPHY W/I&R: CPT | Performed by: STUDENT IN AN ORGANIZED HEALTH CARE EDUCATION/TRAINING PROGRAM

## 2025-04-15 ASSESSMENT — TONOMETRY
OD_IOP_MMHG: 13
OS_IOP_MMHG: 18

## 2025-04-15 ASSESSMENT — REFRACTION_AUTOREFRACTION
OS_AXIS: 112
OS_CYLINDER: -1.00
OD_SPHERE: +1.00
OS_SPHERE: +0.25
OD_CYLINDER: -1.25
OD_AXIS: 080

## 2025-04-15 ASSESSMENT — REFRACTION_CURRENTRX
OS_AXIS: 115
OS_SPHERE: +1.00
OS_OVR_VA: 20/
OS_CYLINDER: -1.00
OS_SPHERE: +1.00
OD_SPHERE: +1.75
OS_AXIS: 119
OD_OVR_VA: 20/
OD_CYLINDER: -0.50
OD_SPHERE: +1.75
OD_ADD2: COMPUTER
OD_CYLINDER: -0.50
OD_AXIS: 062
OS_OVR_VA: 20/
OS_CYLINDER: -1.00
OD_AXIS: 55
OD_OVR_VA: 20/

## 2025-04-15 ASSESSMENT — REFRACTION_MANIFEST
OS_ADD: +2.00
OD_CYLINDER: -0.50
OD_VA1: 20/25-
OD_ADD2: +2.25
OS_SPHERE: -0.75
OS_CYLINDER: -0.50
OU_VA: 20/20
OS_ADD: +1.25
OD_SPHERE: +0.75
OS_VA1: 20/25
OS_VA1: 20/25
OS_CYLINDER: -1.00
OD_AXIS: 55
OD_AXIS: 055
OS_SPHERE: -0.50
OD_ADD: +2.00
OS_SPHERE: PLANO
OS_AXIS: 95
OD_VA1: 20/20
OD_CYLINDER: -0.50
OD_SPHERE: +0.50
OS_AXIS: 90
OS_VA1: 20/20
OS_ADD2: +2.25
OS_CYLINDER: -1.00
OD_CYLINDER: -0.50
OD_AXIS: 55
OS_AXIS: 090
OD_VA1: 20/20
OD_ADD: +1.25
OD_SPHERE: +0.50

## 2025-04-15 ASSESSMENT — CONFRONTATIONAL VISUAL FIELD TEST (CVF)
OS_FINDINGS: FULL
OD_FINDINGS: FULL

## 2025-04-15 ASSESSMENT — KERATOMETRY
OD_AXISANGLE_DEGREES: 077
OS_K1POWER_DIOPTERS: 45.00
OS_K2POWER_DIOPTERS: 45.25
OS_AXISANGLE_DEGREES: 111
OD_K2POWER_DIOPTERS: 46.25
OD_K1POWER_DIOPTERS: 45.25

## 2025-04-15 ASSESSMENT — VISUAL ACUITY
OS_BCVA: 20/20
OD_BCVA: 20/20-

## 2025-04-17 ENCOUNTER — APPOINTMENT (OUTPATIENT)
Dept: PAIN MANAGEMENT | Facility: CLINIC | Age: 60
End: 2025-04-17

## 2025-04-17 DIAGNOSIS — M54.16 RADICULOPATHY, LUMBAR REGION: ICD-10-CM

## 2025-04-17 PROCEDURE — 64484 NJX AA&/STRD TFRM EPI L/S EA: CPT | Mod: LT,59

## 2025-04-17 PROCEDURE — 64483 NJX AA&/STRD TFRM EPI L/S 1: CPT | Mod: LT

## 2025-04-17 PROCEDURE — 82948 REAGENT STRIP/BLOOD GLUCOSE: CPT

## 2025-04-30 ENCOUNTER — APPOINTMENT (OUTPATIENT)
Dept: PAIN MANAGEMENT | Facility: CLINIC | Age: 60
End: 2025-04-30
Payer: COMMERCIAL

## 2025-04-30 VITALS — WEIGHT: 199 LBS | BODY MASS INDEX: 31.98 KG/M2 | HEIGHT: 66 IN

## 2025-04-30 DIAGNOSIS — M51.9 UNSPECIFIED THORACIC, THORACOLUMBAR AND LUMBOSACRAL INTERVERTEBRAL DISC DISORDER: ICD-10-CM

## 2025-04-30 DIAGNOSIS — M54.16 RADICULOPATHY, LUMBAR REGION: ICD-10-CM

## 2025-04-30 PROCEDURE — 99214 OFFICE O/P EST MOD 30 MIN: CPT

## (undated) DEVICE — PACK IV START WITH CHG

## (undated) DEVICE — CATH IV SAFE BC 22G X 1" (BLUE)

## (undated) DEVICE — SYR ALLIANCE II INFLATION 60ML

## (undated) DEVICE — BITE BLOCK ADULT 20 X 27MM (GREEN)

## (undated) DEVICE — FOLEY HOLDER STATLOCK 2 WAY ADULT

## (undated) DEVICE — CATH IV SAFE BC 20G X 1.16" (PINK)

## (undated) DEVICE — SUCTION YANKAUER NO CONTROL VENT

## (undated) DEVICE — TUBING SUCTION 20FT

## (undated) DEVICE — BIOPSY FORCEP RADIAL JAW 4 STANDARD WITH NEEDLE

## (undated) DEVICE — TUBING SUCTION CONN 6FT STERILE

## (undated) DEVICE — SOL INJ NS 0.9% 500ML 2 PORT

## (undated) DEVICE — TUBING IV SET GRAVITY 3Y 100" MACRO

## (undated) DEVICE — SENSOR O2 FINGER ADULT

## (undated) DEVICE — BALLOON US ENDO